# Patient Record
Sex: FEMALE | Race: WHITE
[De-identification: names, ages, dates, MRNs, and addresses within clinical notes are randomized per-mention and may not be internally consistent; named-entity substitution may affect disease eponyms.]

---

## 2019-02-06 ENCOUNTER — HOSPITAL ENCOUNTER (EMERGENCY)
Dept: HOSPITAL 42 - ED | Age: 39
LOS: 1 days | Discharge: HOME | End: 2019-02-07
Payer: SELF-PAY

## 2019-02-06 VITALS — RESPIRATION RATE: 18 BRPM

## 2019-02-06 VITALS — BODY MASS INDEX: 24.7 KG/M2
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
## 2019-02-06 DIAGNOSIS — S93.402A: Primary | ICD-10-CM

## 2019-02-06 NOTE — ED PDOC
Arrival/HPI





- General


Chief Complaint: Lower Extremity Problem/Injury


Time Seen by Provider: 02/06/19 21:27


Historian: Patient





- History of Present Illness


Narrative History of Present Illness (Text): 





02/06/19 21:27 


Kirsten Mathis is a 38 year old female, with no significant past medical history, 

who presents to Emergency department complaining of burning left foot pain 

radiating to her left ankle. Patient notes she recently sprained her left ankle 

and has been ambulating on it. Patient states she took Ibuprofen with no 

significant relief. Patient denies weakness/numbness/tingling in the extremity, 

decreased range of motion, chest pain, shortness of breath, other trauma/injury,

or any other complaint.





Time/Duration: < week (2 days)


Symptom Onset: Gradual


Symptom Course: Unchanged


Quality: Burning


Activities at Onset: Light


Context: Walking, Home





Past Medical History





- Provider Review


Nursing Documentation Reviewed: Yes





- Reproductive


Currently Pregnant: No





- Psychiatric


Hx Substance Use: No





- Anesthesia


Hx Anesthesia: No





Family/Social History





- Physician Review


Nursing Documentation Reviewed: Yes


Family/Social History: Unknown Family HX


Smoking Status: Never Smoked


Hx Alcohol Use: No


Hx Substance Use: No





Allergies/Home Meds


Allergies/Adverse Reactions: 


Allergies





No Known Allergies Allergy (Verified 02/06/19 21:12)


   











Review of Systems





- Physician Review


All systems were reviewed & negative as marked: Yes





- Review of Systems


Constitutional: absent: Fevers, Night Sweats


Respiratory: absent: SOB, Cough


Gastrointestinal: absent: Abdominal Pain, Diarrhea, Nausea, Vomiting


Genitourinary Female: absent: Dysuria


Musculoskeletal: Arthralgias (+left foot pain).  absent: Back Pain, Neck Pain


Neurological: absent: Headache, Dizziness





Physical Exam


Vital Signs Reviewed: Yes





Vital Signs











  Temp Pulse Resp BP Pulse Ox


 


 02/06/19 21:15  98.3 F  70  18  102/63  99











Temperature: Afebrile


Blood Pressure: Normal


Pulse: Regular


Respiratory Rate: Normal


Appearance: Positive for: Well-Appearing, Non-Toxic, Comfortable


Pain Distress: None


Mental Status: Positive for: Alert and Oriented X 3





- Systems Exam


Head: Present: Atraumatic, Normocephalic


Pupils: Present: PERRL


Extroacular Muscles: Present: EOMI


Conjunctiva: Present: Normal


Neck: Present: Normal Range of Motion


Respiratory/Chest: Present: Clear to Auscultation, Good Air Exchange.  No: 

Respiratory Distress, Accessory Muscle Use


Cardiovascular: Present: Regular Rate and Rhythm, Normal S1, S2.  No: Murmurs


Upper Extremity: Present: Normal Inspection, Normal ROM.  No: Cyanosis, Edema


Lower Extremity: Present: Normal Inspection, NORMAL PULSES, Normal ROM, 

Neurovascularly Intact.  No: Edema, CALF TENDERNESS, Gillian's Sign, Tenderness, 

Swelling


Neurological: Present: GCS=15, CN II-XII Intact, Speech Normal, Motor Func 

Grossly Intact, Normal Sensory Function


Skin: Present: Warm, Dry, Normal Color.  No: Rashes


Psychiatric: Present: Alert, Oriented x 3, Normal Insight, Normal Concentration





Medical Decision Making


ED Course and Treatment: 





02/06/19 21:27 


Impression: Patient is a 38 year old female c/o left foot/ankle pain x2 days.





Plan: 


-- Left Ankle X-Ray 


-- Left Foot X-Ray


-- Reassess and disposition





Prior Visits:


Notes and results from previous visits were reviewed. 





Progress Notes:


02/06/19 23:17


reviewed radiology, XR Left Ankle shows no acute processes, no fractures.


XR Left Foot shows no acute processes, no fractures.











- RAD Interpretation


Radiology Orders: 











02/06/19 21:31


ANKLE LEFT 3 VIEWS ROUTINE [RAD] Stat 





02/06/19 21:37


FOOT LEFT 3 VIEWS ROUTINE [RAD] Stat 











: ED Physician





- Scribe Statement


The provider has reviewed the documentation as recorded by the [facility] [facility] with Janett 





All medical record entries made by the Scribe were at my direction and 

personally dictated by me. I have reviewed the chart and agree that the record 

accurately reflects my personal performance of the history, physical exam, 

medical decision making, and the department course for this patient. I have also

 personally directed, reviewed, and agree with the discharge instructions and 

disposition.








Disposition/Present on Arrival





- Present on Arrival


Any Indicators Present on Arrival: No


History of DVT/PE: No


History of Uncontrolled Diabetes: No


Urinary Catheter: No


History of Decub. Ulcer: No


History Surgical Site Infection Following: None





- Disposition


Have Diagnosis and Disposition been Completed?: Yes


Diagnosis: 


 Ankle pain, Ankle sprain





Disposition: HOME/ ROUTINE


Disposition Time: 23:24


Patient Plan: Discharge


Condition: GOOD


Discharge Instructions (ExitCare):  Ankle Sprain (DC)


Additional Instructions: 


Use air cast/take meds as prescribed/follow up in orthopedic clinic this week


Prescriptions: 


Naproxen [Naprosyn Tab] 375 mg PO BID PRN #14 tab


 PRN Reason: Pain, Moderate (4-7)


Referrals: 


[facility] [place] [Outside] - Follow up with primary


Forms:  [employer] (English)

## 2019-02-07 VITALS
OXYGEN SATURATION: 100 % | DIASTOLIC BLOOD PRESSURE: 68 MMHG | SYSTOLIC BLOOD PRESSURE: 108 MMHG | HEART RATE: 74 BPM | TEMPERATURE: 98.5 F

## 2019-02-07 NOTE — RAD
Date of service: 



02/06/2019



PROCEDURE:  Left Ankle Radiographs.



HISTORY:

Pain/injury 



COMPARISON:

None available.



FINDINGS:



BONES:

Bone alignment and mineralization are normal.  There is no acute 

displaced fracture or bone destruction.



JOINTS:

Normal.  Ankle mortise maintained. Talar dome intact



SOFT TISSUES:

Normal. 



OTHER FINDINGS:

None.



IMPRESSION:

No acute fracture or dislocation.

## 2019-02-07 NOTE — RAD
Date of service: 



02/06/2019



PROCEDURE:  Left Foot Radiographs.



HISTORY:

 pain 



COMPARISON:

None.



FINDINGS:



BONES:

Bone alignment and mineralization are normal.  There is no acute 

displaced fracture or bone destruction.



JOINTS:

Normal. 



SOFT TISSUES:

Normal. 



OTHER FINDINGS:

None.



IMPRESSION:

No acute fracture or dislocation.